# Patient Record
Sex: FEMALE | Race: BLACK OR AFRICAN AMERICAN
[De-identification: names, ages, dates, MRNs, and addresses within clinical notes are randomized per-mention and may not be internally consistent; named-entity substitution may affect disease eponyms.]

---

## 2019-01-09 PROBLEM — Z00.00 ENCOUNTER FOR PREVENTIVE HEALTH EXAMINATION: Status: ACTIVE | Noted: 2019-01-09

## 2019-02-05 ENCOUNTER — APPOINTMENT (OUTPATIENT)
Dept: PLASTIC SURGERY | Facility: CLINIC | Age: 24
End: 2019-02-05
Payer: COMMERCIAL

## 2019-02-05 VITALS — HEIGHT: 63 IN | WEIGHT: 221 LBS | BODY MASS INDEX: 39.16 KG/M2

## 2019-02-05 DIAGNOSIS — G40.909 EPILEPSY, UNSPECIFIED, NOT INTRACTABLE, W/OUT STATUS EPILEPTICUS: ICD-10-CM

## 2019-02-05 DIAGNOSIS — Z78.9 OTHER SPECIFIED HEALTH STATUS: ICD-10-CM

## 2019-02-05 DIAGNOSIS — Z86.2 PERSONAL HISTORY OF DISEASES OF THE BLOOD AND BLOOD-FORMING ORGANS AND CERTAIN DISORDERS INVOLVING THE IMMUNE MECHANISM: ICD-10-CM

## 2019-02-05 PROCEDURE — 99203 OFFICE O/P NEW LOW 30 MIN: CPT

## 2019-02-05 RX ORDER — LEVETIRACETAM 1000 MG/1
1000 TABLET, FILM COATED ORAL
Refills: 0 | Status: ACTIVE | COMMUNITY

## 2019-02-05 RX ORDER — CHLORHEXIDINE GLUCONATE 4 %
325 (65 FE) LIQUID (ML) TOPICAL
Refills: 0 | Status: ACTIVE | COMMUNITY

## 2019-02-05 NOTE — HISTORY OF PRESENT ILLNESS
[FreeTextEntry1] : Pt is a 22 y/o F with PMH of epilepsy and SONAM who was referred by Dr. Cadena for evaluation of left lateral calf skin lesion, present x several months. Denies trauma. States it has gotten slightly firmer but no change in size. Denies personal or family hx of Skin CA.\par \par Nonsmoker, nondiabetic

## 2019-02-05 NOTE — ASSESSMENT
[FreeTextEntry1] : 22 y/o F with LLE firm subcutaneous mass\par \par Left lateral proximal calf soft tissue lesion likely c/w lipoma\par \par nonsmoker\par nondiabetic\par \par seizure disorder--3-5 seizures per month\par work as a  at [employer]\par \par Lesion approx 4cm x 2.5 cm, firm, darkened overlying skin\par \par Offered excision \par \par Pt to consider options--either in office biopsy or excsion in [place]\par \par Regarding the procedure, we discussed scarring, poor wound healing, bleeding, infection, need for additional surgery, and dissatisfaction with the outcome.  Also discussed possibility of keloid and/or hypertrophic scar formation as well as recurrence.  All questions were answered and risks understood.\par

## 2019-02-05 NOTE — PHYSICAL EXAM
[de-identified] : well-appearing, NAD [de-identified] : Left lateral superior calf with 2x3cm firm subcutaneous mass, nontender, fixed, normal sensation, no open wounds, overlying skin slightly hyperpigmented

## 2019-05-31 ENCOUNTER — APPOINTMENT (OUTPATIENT)
Dept: PLASTIC SURGERY | Facility: CLINIC | Age: 24
End: 2019-05-31

## 2019-08-02 ENCOUNTER — LABORATORY RESULT (OUTPATIENT)
Age: 24
End: 2019-08-02

## 2019-08-02 ENCOUNTER — OUTPATIENT (OUTPATIENT)
Dept: OUTPATIENT SERVICES | Facility: HOSPITAL | Age: 24
LOS: 1 days | Discharge: HOME | End: 2019-08-02

## 2019-08-02 ENCOUNTER — APPOINTMENT (OUTPATIENT)
Dept: PLASTIC SURGERY | Facility: CLINIC | Age: 24
End: 2019-08-02
Payer: COMMERCIAL

## 2019-08-02 PROCEDURE — 13120 CMPLX RPR S/A/L 1.1-2.5 CM: CPT | Mod: 59

## 2019-08-02 PROCEDURE — 11402 EXC TR-EXT B9+MARG 1.1-2 CM: CPT

## 2019-08-02 NOTE — PROCEDURE
[FreeTextEntry6] : Patient is a 23 year old female with a left lateral calf lipoma measuring approximately 2 x 2.2 cm.  \par \par The area was prepped and draped in the usual fashion.  Local anesthetic was administered using 1% lidocaine with epinephrine.\par \par The lipoma was sharply excised.  Area was irrigated copiously.  Complex wound closure was performed in layers.  The wound measured approximately 2.5  cm.\par \par Sterile dressing applied.  \par \par Patient tolerated procedure well and understands post-op instructions.\par \par Sutures Used: 3-0 monocryl\par \par \par

## 2019-08-05 DIAGNOSIS — D48.5 NEOPLASM OF UNCERTAIN BEHAVIOR OF SKIN: ICD-10-CM

## 2019-08-16 ENCOUNTER — APPOINTMENT (OUTPATIENT)
Dept: PLASTIC SURGERY | Facility: CLINIC | Age: 24
End: 2019-08-16
Payer: COMMERCIAL

## 2019-08-16 DIAGNOSIS — D48.5 NEOPLASM OF UNCERTAIN BEHAVIOR OF SKIN: ICD-10-CM

## 2019-08-16 PROCEDURE — 99212 OFFICE O/P EST SF 10 MIN: CPT

## 2019-08-16 NOTE — DATA REVIEWED
[FreeTextEntry1] : \par  Biopsy             Final\par \par No Documents Attached\par \par \par \par \par   JEFF DANGELO                      2\par \par \par \par                  Surgical Final Report\par \par \par \par \par           Final Diagnosis\par           Left calf lipoma;\par           Because of atypical nature of the tumor, the case was sent out\par           for consultation. see the report below.\par \par           -----------------------------------------------------------------\par           -------------------------------------------------\par \par           Report [id number] received from [facility] and [facility], [address] [place] [address] by Dr. Rae\tobin Snowden M.D. on 8/14/19 with the following diagnosis:\par \par           The specimen consists mainly of adipose tissue within which there\par           is a florid mixed inflammatory infiltrate which includes focally\par           numerous large pale-staining histiocytes, some of which show\par           striking cytophagocytosis. There is no atypia. The large pale\par           staining histiocytes are highlighted by striking immunopositivity\par           for S100 protein, which also facilitates recognition of the\par           cytophagocytosis. 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 Localized lesions of this type\par           are generally cured by simple local excision, but more widespread\par           disease (which is infrequent) is harder to control. The cause of\par           these lesions remains poorly understood. There is certainly no\par           evidence of malignancy.\par \par           Complete report is available in the electronic medical record as\par           a PATHOLOGY CONSULT REPORT\par \par           Verified by: Griselda Betancourt M.D.\par           (Electronic Signature)\par           Reported on: 08/15/19 14:21 EDT, [facility] [place] [address]\par           _________________________________________________________________\par \par           Clinical History\par           Excision of left lateral calf lipoma\par \par           Specimen(s) Submitted\par           Left lateral calf lipoma\par \par           Gross Description\par           The specimen is received in formalin, labeled "left lateral calf\par           lipoma" and consists of multiple fragments of yellow tan adipose\par           tissue measuring 1.6 x 1.5 x 0.8 cm in aggregate. The specimen is\par           inked black and serially sectioned. On cut section there is a\par           yellow tan homogenous appearance. The specimen is submitted\par           entirely. 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

## 2019-08-16 NOTE — HISTORY OF PRESENT ILLNESS
[FreeTextEntry1] : Pt is a 22 y/o F with PMH of epilepsy and SONAM who was referred by Dr. Cadena for evaluation of left lateral calf skin lesion, present x several months. Denies trauma. States it has gotten slightly firmer but no change in size. Denies personal or family hx of Skin CA.\par \par Nonsmoker, nondiabetic\par \par Interval hx (8/16/19): Pt presents for f/u- POD #14 s/p excision of left lateral calf skin lesion. Offers no complaints.

## 2019-08-16 NOTE — PHYSICAL EXAM
[de-identified] : well-appearing, NAD [de-identified] : Left lateral superior calf  incision healing well, sutures C/D/I, incisional tenderness as expected, no significant erythema/swelling\par

## 2019-08-16 NOTE — ASSESSMENT
[FreeTextEntry1] : 24 y/o F POD #14 s/p excision of left lateral proximal calf soft tissue lesion\par -Pathology reviewed, atypical, "certainly no evidence of malignancy"; will f/u addendum\par -Sutures removed\par -Steri strip applied\par -Wound care and scar creams reviewed\par -Sun protection and routine dermatology f/u\par -F/u 2 months with Dr. Castano\par

## 2019-10-24 ENCOUNTER — APPOINTMENT (OUTPATIENT)
Dept: PLASTIC SURGERY | Facility: CLINIC | Age: 24
End: 2019-10-24
Payer: COMMERCIAL

## 2019-10-24 PROCEDURE — 99212 OFFICE O/P EST SF 10 MIN: CPT

## 2019-10-24 NOTE — ASSESSMENT
[FreeTextEntry1] : \par Referring Physician:  Dr. Cadena; PMD: Matteo Rahman. \par JEFF DANGELO is being seen for an initial evaluation. \par  \par History of Present Illness\par Pt is a 24 y/o F with PMH of epilepsy and SONAM who was referred by Dr. Cadena for evaluation of left lateral calf skin lesion, present x several months. Denies trauma. States it has gotten slightly firmer but no change in size. Denies personal or family hx of Skin CA.\par \par Nonsmoker, nondiabetic \par  \par Active Problems\par Neoplasm of uncertain behavior of skin (238.2) (D48.5)\par \par Past Medical History\par History of Epilepsy (345.90) (G40.909)\par History of iron deficiency anemia (V12.3) (Z86.2)\par \par Surgical History\par History of Breast surgery\par History of Surgical removal of foreign body\par \par Social History\par Never a smoker\par Social alcohol use (Z78.9)\par \par Current Meds\par Ferrous Sulfate 325 (65 Fe) MG Oral Tablet\par Keppra 1000 MG Oral Tablet\par \par Allergies\par No Known Drug Allergies\par \par Vitals\par Vital Signs \par 	Recorded: [id number] 02:34PM\par Height	5 ft 3 in\par Weight	221 lb \par BMI Calculated	39.15\par BSA Calculated	2.02\par \par Physical Exam\par \par Constitutional: well-appearing, NAD \par Extremities: Left lateral superior calf with 2x3cm firm subcutaneous mass, nontender, fixed, normal sensation, no open wounds, overlying skin slightly hyperpigmented \par  \par Assessment\par Neoplasm of uncertain behavior of skin (238.2) (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 Also discussed possibility of keloid and/or hypertrophic scar formation as well as recurrence. All questions were answered and risks understood.\par  \par left calf fine\par no issues\par reviewed path w patient\par \par f/u Dec/Jan

## 2024-09-03 ENCOUNTER — NON-APPOINTMENT (OUTPATIENT)
Age: 29
End: 2024-09-03